# Patient Record
Sex: FEMALE | Race: WHITE
[De-identification: names, ages, dates, MRNs, and addresses within clinical notes are randomized per-mention and may not be internally consistent; named-entity substitution may affect disease eponyms.]

---

## 2020-10-10 ENCOUNTER — HOSPITAL ENCOUNTER (EMERGENCY)
Dept: HOSPITAL 39 - ER | Age: 25
Discharge: HOME | End: 2020-10-10
Payer: COMMERCIAL

## 2020-10-10 VITALS — SYSTOLIC BLOOD PRESSURE: 123 MMHG | TEMPERATURE: 96.9 F | DIASTOLIC BLOOD PRESSURE: 71 MMHG

## 2020-10-10 VITALS — OXYGEN SATURATION: 98 %

## 2020-10-10 DIAGNOSIS — F41.0: ICD-10-CM

## 2020-10-10 DIAGNOSIS — E83.42: ICD-10-CM

## 2020-10-10 DIAGNOSIS — R00.0: Primary | ICD-10-CM

## 2020-10-10 DIAGNOSIS — R07.89: ICD-10-CM

## 2020-10-10 PROCEDURE — 71045 X-RAY EXAM CHEST 1 VIEW: CPT

## 2020-10-10 PROCEDURE — 83735 ASSAY OF MAGNESIUM: CPT

## 2020-10-10 PROCEDURE — 84703 CHORIONIC GONADOTROPIN ASSAY: CPT

## 2020-10-10 PROCEDURE — 80053 COMPREHEN METABOLIC PANEL: CPT

## 2020-10-10 PROCEDURE — 81001 URINALYSIS AUTO W/SCOPE: CPT

## 2020-10-10 PROCEDURE — 36415 COLL VENOUS BLD VENIPUNCTURE: CPT

## 2020-10-10 PROCEDURE — 85379 FIBRIN DEGRADATION QUANT: CPT

## 2020-10-10 PROCEDURE — 85025 COMPLETE CBC W/AUTO DIFF WBC: CPT

## 2020-10-10 PROCEDURE — 93005 ELECTROCARDIOGRAM TRACING: CPT

## 2020-10-10 PROCEDURE — 84484 ASSAY OF TROPONIN QUANT: CPT

## 2020-10-10 PROCEDURE — 83880 ASSAY OF NATRIURETIC PEPTIDE: CPT

## 2020-10-10 PROCEDURE — 83605 ASSAY OF LACTIC ACID: CPT

## 2020-10-10 PROCEDURE — 84443 ASSAY THYROID STIM HORMONE: CPT

## 2020-10-10 NOTE — ED.PDOC
History of Present Illness





- General


Time Seen by Provider: 10/10/20 15:39


Source: patient





- History of Present Illness


Initial Comments: 





25-year-old female with past medical history of anxiety, panic disorder, 

interstitial cystitis who presents with chief complaint of heart racing and 

chest tightness.  Reports she had cystoscopy procedure done outpatient 5 days 

ago and was diagnosed with interstitial cystitis.  She was prescribed 

amitriptyline and Flomax which she took 1 dose of at night.  Reports the 

following morning she woke up in the middle the night with symptoms of heart 

racing and chest tightness and noted that her pulse was rapid.  Reports this has

recurred every night since then and seems to be worsening and becoming more 

frequent.  States she woke up at 12:30 AM today with onset again of symptoms.  

Symptoms went away after relaxing and lying back down but have since recurred 3 

more times since then.  Reports the most recent episode began several hours ago 

while she was out shopping with a friend.  She developed tightness in the left 

side of her chest which has been constant since then, no radiation, rates as 

5/10 severity.  She also reports constant palpitations.  She noted that her 

pulse was 150 when she checked it she decided to come into the ED.  Reports also

moderate dyspnea. She tried taking Xanax 0.5 mg PO x2 today w/o relief. Denies 

cough, fevers, chills, abd pain, n/v/d, urinary sx's.





She does report a history of panic attacks or symptoms of chest tightness and 

palpitations but they have not lasted for this long.  She reports current 

symptoms seem similar to when she has panic attacks but believes that the s

ymptoms this time are causing her anxiety and worry rather than with her panic 

attacks she usually first has anxiety and worry and then the symptoms later.  

Denies any prior cardiac hx or significant family hx of cardiac issues.  No 

prior hx of DVT or PE.





LMP was 1 week ago.  Reports she does occasionally have heavy periods. 





Allergies/Adverse Reactions: 


Allergies





NO KNOWN ALLERGY Allergy (Verified 10/10/20 16:39)


   





Home Medications: 


Ambulatory Orders





Metoprolol Tartrate 12.5 mg PO BID 30 Days #30 tab 10/10/20 











Review of Systems





- Review of Systems


Review of Systems: 





10/10/20 16:01


as per HPI





All other Systems: Reviewed and Negative





Family Medical History





- Family History


  ** Mother


Family History: Unknown


Living Status: Still Living





Physical Exam





- Physical Exam


General Appearance: Alert, Anxious, No apparent distress


Eye Exam: bilateral normal


Ears, Nose, Throat: hearing grossly normal, normal ENT inspection, normal 

pharynx


Neck: non-tender, full range of motion, supple, normal inspection


Respiratory: chest non-tender, lungs clear, normal breath sounds, no respiratory

distress, no accessory muscle use


Cardiovascular/Chest: normal peripheral pulses, no edema, no gallop, no JVD, no 

murmur, tachycardia


Peripheral Pulses: radial,right: 2+, radial,left: 2+


Gastrointestinal/Abdominal: non tender, soft, no organomegaly


Back Exam: normal inspection, no CVA tenderness, no vertebral tenderness


Extremity: normal range of motion, non-tender, normal inspection, no pedal 

edema, no calf tenderness, normal capillary refill


Neurologic: CNs II-XII nml as tested, no motor/sensory deficits, alert, normal 

mood/affect, oriented x 3


Skin Exam: normal color, warm/dry





Progress





- Progress


Progress: 





10/10/20 16:02


Palpitations, tachycardia, chest tightness


-Consider organic versus nonorganic causes.  Intrinsic cardiac 

disease/arrhythmia, electrolyte derangement, hyperthyroidism, anemia, 

anxiety/panic disorder, DVT/PE, infection, other


-Obtain formal cardiac work-up, blood work, chest x-ray, EKG, magnesium, TSH, 

urine, pregnancy test


-1 L NS bolus, Xanax 0.5 mg PO for anxiety





10/10/20 16:26


-Heart rate improved to 95 and patient reports feeling much better after the 

Xanax and approximately 300 cc of normal saline.  Continue to monitor.  Mg 1.7 -

will give Mg sulfate 2 g IV.





10/10/20 18:29


-Pt with intermittent improvement of HR to 90s but increased again to 110s-120s.

 BP remains wnl.  Reports improving anxiety.


-Labwork otherwise unremarkable besides slightly low Mg level as above.  CXR 

normal appearing.


-giving Ativan 2 mg IV and another 1 L NS bolus.  Will plan to discuss further 

with cardiology for possible outpatient f/u.





10/10/20 19:51


-Patient remained stable.  Patient continues to be in sinus tachycardia ranging 

from 100s to 120s, blood pressure stable 130s/80s.  Reports anxiety much better 

with Ativan and IV fluids but tachycardia is still not fully resolved.  I did 

discuss the patient briefly with Dr. Mccormack, cardiology at [facility], who agrees with

the ED work-up.  He states that if the patient is having symptomatic 

palpitations then she can be discharged with a trial of metoprolol 12.5 mg twice

daily and agrees further outpatient consultation can be done.  Will give first 

dose of oral metoprolol here and sent home with prescription.  Follow-up with 

her PCP is recommended in the next 5 to 7 days for repeat evaluation.








Cristino Braden MD


Billing #[id number]








                                        





10/10/20 15:39


Sodium Chloride 0.9% (Flush) [Saline Flush Syringe]   10 ml IV PRN PRN 





10/10/20 15:45


EKG STAT 





10/10/20 18:26


Sodium Chloride 0.9% 1000ML [Ns 1000 ml] 1,000 ml IVS ONCE 





10/11/20 09:00


Pulse Ox Daily 








                         Laboratory Results - last 24 hr











  10/10/20 10/10/20 10/10/20





  15:54 15:54 15:54


 


WBC  6.8  


 


RBC  4.61  


 


Hgb  13.4  


 


Hct  39.8  


 


MCV  86.3  


 


MCH  29.0  


 


MCHC  33.6  


 


RDW  12.9  


 


Plt Count  243  


 


MPV  9.3  


 


Absolute Neuts (auto)  4.20  


 


Absolute Lymphs (auto)  2.10  


 


Absolute Monos (auto)  0.50  


 


Absolute Eos (auto)  0.00  


 


Absolute Basos (auto)  0.00  


 


Neutrophils %  60.7  


 


Lymphocytes %  31.3  


 


Monocytes %  7.1  


 


Eosinophils %  0.3 L  


 


Basophils %  0.6  


 


D-Dimer, Quantitative   < 131.0 L 


 


Sodium    138


 


Potassium    3.4 L


 


Chloride    105


 


Carbon Dioxide    23


 


Anion Gap    13.4


 


BUN    19 H


 


Creatinine    1.00


 


BUN/Creatinine Ratio    19.0


 


Random Glucose    122 H


 


Serum Osmolality    279.2


 


Lactic Acid   


 


Calcium    9.0


 


Magnesium   


 


Total Bilirubin    0.4


 


AST    19


 


ALT    23


 


Alkaline Phosphatase    38 L


 


Troponin I   


 


B-Natriuretic Peptide    < 15.0


 


Serum Total Protein    7.4


 


Albumin    4.4


 


Globulin    3.0


 


Albumin/Globulin Ratio    1.5


 


TSH   


 


Serum HCG, Qual   


 


Urine Color   


 


Urine Appearance   


 


Urine pH   


 


Ur Specific Gravity   


 


Urine Protein   


 


Urine Glucose (UA)   


 


Urine Ketones   


 


Urine Blood   


 


Urine Nitrite   


 


Urine Bilirubin   


 


Urine Urobilinogen   


 


Ur Leukocyte Esterase   


 


Urine RBC   


 


Urine WBC   


 


Ur Epithelial Cells   


 


Urine Bacteria   














  10/10/20 10/10/20 10/10/20





  15:54 15:54 15:57


 


WBC   


 


RBC   


 


Hgb   


 


Hct   


 


MCV   


 


MCH   


 


MCHC   


 


RDW   


 


Plt Count   


 


MPV   


 


Absolute Neuts (auto)   


 


Absolute Lymphs (auto)   


 


Absolute Monos (auto)   


 


Absolute Eos (auto)   


 


Absolute Basos (auto)   


 


Neutrophils %   


 


Lymphocytes %   


 


Monocytes %   


 


Eosinophils %   


 


Basophils %   


 


D-Dimer, Quantitative   


 


Sodium   


 


Potassium   


 


Chloride   


 


Carbon Dioxide   


 


Anion Gap   


 


BUN   


 


Creatinine   


 


BUN/Creatinine Ratio   


 


Random Glucose   


 


Serum Osmolality   


 


Lactic Acid  1.4  


 


Calcium   


 


Magnesium    1.7 L


 


Total Bilirubin   


 


AST   


 


ALT   


 


Alkaline Phosphatase   


 


Troponin I   < 0.02 


 


B-Natriuretic Peptide   


 


Serum Total Protein   


 


Albumin   


 


Globulin   


 


Albumin/Globulin Ratio   


 


TSH    1.27


 


Serum HCG, Qual   


 


Urine Color   


 


Urine Appearance   


 


Urine pH   


 


Ur Specific Gravity   


 


Urine Protein   


 


Urine Glucose (UA)   


 


Urine Ketones   


 


Urine Blood   


 


Urine Nitrite   


 


Urine Bilirubin   


 


Urine Urobilinogen   


 


Ur Leukocyte Esterase   


 


Urine RBC   


 


Urine WBC   


 


Ur Epithelial Cells   


 


Urine Bacteria   














  10/10/20 10/10/20





  15:57 17:42


 


WBC  


 


RBC  


 


Hgb  


 


Hct  


 


MCV  


 


MCH  


 


MCHC  


 


RDW  


 


Plt Count  


 


MPV  


 


Absolute Neuts (auto)  


 


Absolute Lymphs (auto)  


 


Absolute Monos (auto)  


 


Absolute Eos (auto)  


 


Absolute Basos (auto)  


 


Neutrophils %  


 


Lymphocytes %  


 


Monocytes %  


 


Eosinophils %  


 


Basophils %  


 


D-Dimer, Quantitative  


 


Sodium  


 


Potassium  


 


Chloride  


 


Carbon Dioxide  


 


Anion Gap  


 


BUN  


 


Creatinine  


 


BUN/Creatinine Ratio  


 


Random Glucose  


 


Serum Osmolality  


 


Lactic Acid  


 


Calcium  


 


Magnesium  


 


Total Bilirubin  


 


AST  


 


ALT  


 


Alkaline Phosphatase  


 


Troponin I  


 


B-Natriuretic Peptide  


 


Serum Total Protein  


 


Albumin  


 


Globulin  


 


Albumin/Globulin Ratio  


 


TSH  


 


Serum HCG, Qual  Negative 


 


Urine Color   Yellow


 


Urine Appearance   Clear


 


Urine pH   6.0


 


Ur Specific Gravity   1.010


 


Urine Protein   Negative


 


Urine Glucose (UA)   Negative


 


Urine Ketones   Negative


 


Urine Blood   Negative


 


Urine Nitrite   Negative


 


Urine Bilirubin   Negative


 


Urine Urobilinogen   0.2


 


Ur Leukocyte Esterase   Negative


 


Urine RBC   0


 


Urine WBC   0


 


Ur Epithelial Cells   0-1


 


Urine Bacteria   0




















- EKG/XRAY/CT


EKG: Sinus, Tachy - Heart rate 120s, no ST elevation or Q waves noted, axis 

normal, intervals normal, nonspecific minimal ST segment depressions noted in 

inferior and lateral leads likely repolarization abnormality, no prior EKG for 

comparison.


XRAY: chest - no acute processes per my read





Departure





- Departure


Clinical Impression: 


 Sinus tachycardia, Hypomagnesemia





Anxiety disorder


Qualifiers:


 Anxiety disorder type: unspecified anxiety disorder Qualified Code(s): F41.9 - 

Anxiety disorder, unspecified





Time of Disposition: 19:49


Disposition: Discharge to Home or Self Care


Condition: Good


Departure Forms:  ED Discharge - Pt. Copy, Patient Portal Self Enrollment


Instructions:  Anxiety, Adult (DC), Panic Disorder (DC), Palpitations (DC)


Diet: resume usual diet - advised limited caffeine and alcohol intake


Activity: increase activity as tolerated


Referrals: 


EVELIO PRECIADO MD [Primary Care Provider] - 1 Week


Prescriptions: 


Metoprolol Tartrate 12.5 mg PO BID 30 Days #30 tab


Home Medications: 


Ambulatory Orders





Metoprolol Tartrate 12.5 mg PO BID 30 Days #30 tab 10/10/20 








Additional Instructions: 


Remain well-hydrated and gradually advance your diet and activity level as 

tolerated.  Return to ED if you develop concerning symptoms such as worsening 

chest pain, feeling weak or faint like he may pass out, shortness of breath, 

fevers, calf pain or swelling, etc.  Follow-up with your primary care physician 

is recommended in the next 3 to 5 days for repeat evaluation or sooner as 

needed.  May benefit from further outpatient testing such as Holter monitoring, 

cardiac echo, cardiology consultation, etc.

## 2020-10-10 NOTE — RAD
EXAM: Chest,1 View



CLINICAL INDICATION: 25-year-old female with chest pain.



TECHNIQUE: Single view, AP portable chest was obtained.



COMPARISON:  None. 



FINDINGS: 



 Unremarkable cardiac and mediastinal silhouette. Heart size is

normal. 

Lungs are clear without focal opacity, pneumothorax or pleural

effusions.  The visualized bones are within normal limits. 



IMPRESSION:  No acute cardiopulmonary abnormalities.



Electronically signed by:  Kylie Saunders MD  10/10/2020 4:19 PM CDT

Workstation: [id number]